# Patient Record
Sex: MALE | Race: WHITE
[De-identification: names, ages, dates, MRNs, and addresses within clinical notes are randomized per-mention and may not be internally consistent; named-entity substitution may affect disease eponyms.]

---

## 2020-12-25 ENCOUNTER — HOSPITAL ENCOUNTER (EMERGENCY)
Dept: HOSPITAL 46 - ED | Age: 80
Discharge: HOME | End: 2020-12-25
Payer: MEDICARE

## 2020-12-25 VITALS — WEIGHT: 179 LBS | BODY MASS INDEX: 26.51 KG/M2 | HEIGHT: 69 IN

## 2020-12-25 DIAGNOSIS — J44.9: ICD-10-CM

## 2020-12-25 DIAGNOSIS — Z79.82: ICD-10-CM

## 2020-12-25 DIAGNOSIS — Z85.118: ICD-10-CM

## 2020-12-25 DIAGNOSIS — Z79.899: ICD-10-CM

## 2020-12-25 DIAGNOSIS — I10: ICD-10-CM

## 2020-12-25 DIAGNOSIS — I82.622: ICD-10-CM

## 2020-12-25 DIAGNOSIS — T81.31XA: Primary | ICD-10-CM

## 2020-12-26 ENCOUNTER — HOSPITAL ENCOUNTER (EMERGENCY)
Dept: HOSPITAL 46 - ED | Age: 80
Discharge: HOME | End: 2020-12-26
Payer: MEDICARE

## 2020-12-26 VITALS — HEIGHT: 69 IN | BODY MASS INDEX: 26.51 KG/M2 | WEIGHT: 179 LBS

## 2020-12-26 DIAGNOSIS — Z76.0: Primary | ICD-10-CM

## 2020-12-26 NOTE — XMS
PreManage Notification: KARIME SOTO MRN:[id number]
 
Security Information
 
Security Events
No recent Security Events currently on file
 
 
 
CRITERIA MET
------------
- [facility] - 2 Visits in 30 Days
 
 
CARE PROVIDERS
There are no care providers on record at this time.
 
[facility] has no Care Guidelines for this patient.
 
DORENE VISIT COUNT (12 MO.)
-------------------------------------------------------------------------------------
2 [facility]
-------------------------------------------------------------------------------------
TOTAL 2
-------------------------------------------------------------------------------------
NOTE: Visits indicate total known visits.
 
ED/C VISIT TRACKING (12 MO.)
-------------------------------------------------------------------------------------
12/26/2020 11:29
[facility] [place]
 
TYPE: Emergency
 
COMPLAINT:
- DRESSING CHANGE
-------------------------------------------------------------------------------------
12/25/2020 10:36
[facility] [place]
 
TYPE: Emergency
 
COMPLAINT:
- POST OP PROBLEM
-------------------------------------------------------------------------------------
 
 
INPATIENT VISIT TRACKING (12 MO.)
No inpatient visits to display in this time frame
 
[email or web address]

## 2020-12-27 ENCOUNTER — HOSPITAL ENCOUNTER (EMERGENCY)
Dept: HOSPITAL 46 - ED | Age: 80
Discharge: HOME | End: 2020-12-27
Payer: MEDICARE

## 2020-12-27 VITALS — HEIGHT: 69 IN | BODY MASS INDEX: 26.51 KG/M2 | WEIGHT: 179 LBS

## 2020-12-27 DIAGNOSIS — Z76.0: Primary | ICD-10-CM

## 2020-12-27 NOTE — XMS
PreManage Notification: KARIME SOTO MRN:[id number]
 
Security Information
 
Security Events
No recent Security Events currently on file
 
 
 
CRITERIA MET
------------
- [facility] - 2 Visits in 30 Days
 
 
CARE PROVIDERS
There are no care providers on record at this time.
 
[facility] has no Care Guidelines for this patient.
 
DORENE VISIT COUNT (12 MO.)
-------------------------------------------------------------------------------------
3 [facility]
-------------------------------------------------------------------------------------
TOTAL 3
-------------------------------------------------------------------------------------
NOTE: Visits indicate total known visits.
 
ED/C VISIT TRACKING (12 MO.)
-------------------------------------------------------------------------------------
12/27/2020 11:40
[facility] [place]
 
TYPE: Emergency
 
COMPLAINT:
- MEDICATION REFILL
-------------------------------------------------------------------------------------
12/26/2020 11:29
[facility] [place]
 
TYPE: Emergency
 
COMPLAINT:
- DRESSING CHANGE
-------------------------------------------------------------------------------------
12/25/2020 10:36
[facility] [place]
 
TYPE: Emergency
 
COMPLAINT:
- POST OP PROBLEM
-------------------------------------------------------------------------------------
 
 
INPATIENT VISIT TRACKING (12 MO.)
No inpatient visits to display in this time frame
 
[email or web address]

## 2021-04-03 ENCOUNTER — HOSPITAL ENCOUNTER (EMERGENCY)
Dept: HOSPITAL 46 - ED | Age: 81
Discharge: HOME | End: 2021-04-03
Payer: MEDICARE

## 2021-04-03 VITALS — HEIGHT: 70 IN | WEIGHT: 178 LBS | BODY MASS INDEX: 25.48 KG/M2

## 2021-04-03 DIAGNOSIS — L03.114: Primary | ICD-10-CM

## 2021-04-03 DIAGNOSIS — Z85.118: ICD-10-CM

## 2021-04-03 DIAGNOSIS — Z87.891: ICD-10-CM

## 2021-04-03 DIAGNOSIS — J43.9: ICD-10-CM

## 2021-04-03 DIAGNOSIS — I10: ICD-10-CM

## 2021-04-03 DIAGNOSIS — Z79.899: ICD-10-CM

## 2021-04-03 DIAGNOSIS — I82.622: ICD-10-CM

## 2021-04-03 DIAGNOSIS — Z79.82: ICD-10-CM

## 2021-04-03 NOTE — XMS
PreManage Notification: KARIME SOTO MRN:[id number]
 
Security Information
 
Security Events
No recent Security Events currently on file
 
 
 
CRITERIA MET
------------
- PDMP
 
 
CARE PROVIDERS
-------------------------------------------------------------------------------------
ESTELLA MILLER     [facility]     12/28/2020-Current
 
PHONE: [phone]
-------------------------------------------------------------------------------------
 
[facility] has no Care Guidelines for this patient.
 
DORENE VISIT COUNT (12 MO.)
-------------------------------------------------------------------------------------
4 [facility]
-------------------------------------------------------------------------------------
TOTAL 4
-------------------------------------------------------------------------------------
NOTE: Visits indicate total known visits.
 
ED/UCC VISIT TRACKING (12 MO.)
-------------------------------------------------------------------------------------
04/03/2021 10:19
[facility] [place]
 
TYPE: Emergency
 
COMPLAINT:
- LT ARM/HAND SWELLING POST CHEMO
-------------------------------------------------------------------------------------
12/27/2020 11:40
[facility] [place]
 
TYPE: Emergency
 
COMPLAINT:
- MEDICATION REFILL
 
DIAGNOSES:
- Encounter for issue of repeat prescription
-------------------------------------------------------------------------------------
12/26/2020 11:29
[facility] [place]
 
TYPE: Emergency
 
COMPLAINT:
- DRESSING CHANGE
 
 
DIAGNOSES:
- Encounter for issue of repeat prescription
-------------------------------------------------------------------------------------
12/25/2020 10:36
[facility] [place]
 
TYPE: Emergency
 
COMPLAINT:
- POST OP PROBLEM
 
DIAGNOSES:
- Long term (current) use of aspirin
- Personal history of other malignant neoplasm of bronchus and lung
- Chronic obstructive pulmonary disease, unspecified
- Acute embolism and thrombosis of deep veins of left upper extremity
- Other long term (current) drug therapy
- Disruption of external operation (surgical) wound, not elsewhere classified,
initial encounter
- Disruption of external operation (surgical) wound, not elsewhere classified,
initial encounter
- Essential (primary) hypertension
- Acute embolism and thrombosis of deep veins of left upper extremity
-------------------------------------------------------------------------------------
 
 
INPATIENT VISIT TRACKING (12 MO.)
No inpatient visits to display in this time frame
 
[email or web address]

## 2021-04-27 ENCOUNTER — HOSPITAL ENCOUNTER (OUTPATIENT)
Dept: HOSPITAL 46 - OPS | Age: 81
Discharge: HOME | End: 2021-04-27
Attending: SURGERY
Payer: MEDICARE

## 2021-04-27 VITALS — BODY MASS INDEX: 24.53 KG/M2 | WEIGHT: 171.34 LBS | HEIGHT: 70 IN

## 2021-04-27 DIAGNOSIS — C77.1: ICD-10-CM

## 2021-04-27 DIAGNOSIS — I10: ICD-10-CM

## 2021-04-27 DIAGNOSIS — Z87.891: ICD-10-CM

## 2021-04-27 DIAGNOSIS — C77.0: ICD-10-CM

## 2021-04-27 DIAGNOSIS — C78.01: ICD-10-CM

## 2021-04-27 DIAGNOSIS — C34.12: Primary | ICD-10-CM

## 2021-04-27 PROCEDURE — 0JH63WZ INSERTION OF TOTALLY IMPLANTABLE VASCULAR ACCESS DEVICE INTO CHEST SUBCUTANEOUS TISSUE AND FASCIA, PERCUTANEOUS APPROACH: ICD-10-PCS | Performed by: SURGERY

## 2021-04-27 PROCEDURE — C1788 PORT, INDWELLING, IMP: HCPCS

## 2021-04-27 NOTE — OR
[facility]
                                    [address], [place]  [address]
_________________________________________________________________________________________
                                                                 Signed   
 
 
DATE OF OPERATION:
04/27/2021
 
SURGEON:
Marj Interiano MD
 
PREOPERATIVE DIAGNOSES:
Metastatic adenocarcinoma of the lung, left neck and perihilar adenopathy, need for
central venous access for chemotherapy. 
 
POSTOPERATIVE DIAGNOSES:
Metastatic adenocarcinoma of the lung, left neck and perihilar adenopathy, need for
central venous access for chemotherapy. 
 
PROCEDURES:
1. Ultrasound-guided right internal jugular Port-A-Cath placement (Bard port catheter).
2. Surgeon-directed fluoroscopy.
 
ANESTHESIA:
Local with monitored anesthesia care, Marj Galloway CRNA and local 8 mL of 0.25% Marcaine
with epinephrine. 
 
INDICATION:
This 80-year-old white man is a patient of Dr. Marj Moulton and under the care of Dr. Cronin for metastatic lung cancer (adenocarcinoma).  He has significant mediastinal
adenopathy and left-sided neck adenopathy related to the malignancy and biopsy in the
area has confirmed the underlying diagnosis.  The patient has been undergoing palliative
chemotherapy and had some radiation therapy as well, but now needs a Port-A-Cath for
ongoing therapy as his peripheral access is diminishing and quite difficult for him.  He
is admitted at this time to undergo Port-A-Cath placement (Bard port catheter type).  He
understands the risks of bleeding, infection, pneumothorax, and other unforeseen
complications.  Understanding this, he wished to proceed. 
 
FINDINGS:
Ultrasound evaluation of the right neck showed a patent internal jugular vein and
normal-appearing carotid artery.  Catheter placement was without complication.  Good
function was noted at conclusion of the procedure.  Postoperative chest x-ray in
recovery room confirms the tip of the catheter in the superior vena cava. 
 
DESCRIPTION OF PROCEDURE:
The patient was brought to the operating room and given intravenous sedation.  He is
placed on a wedge device given his somewhat marginal airway status.  He had poor
 
    Electronically Signed By: MARJ INTERIANO MD  04/27/21 1048
_________________________________________________________________________________________
PATIENT NAME:     KARIME SOTO                  
MEDICAL RECORD #: [id number]            OPERATIVE REPORT              
          ACCT #: [id number]  
DATE OF BIRTH:   [birth year]            REPORT #: [id number]      
PHYSICIAN:        MARJ INTERIANO MD                 
PCP:              ESTELLA MOULTON MD           
REPORT IS CONFIDENTIAL AND NOT TO BE RELEASED WITHOUT AUTHORIZATION
 
 
                                  [facility]
                                    [address], [place]  [address]
_________________________________________________________________________________________
                                                                 Signed   
 
 
flexibility of his neck, though he could turn slightly to the left.  He was placed in
mild Trendelenburg position and the upper neck and torso was clipped and prepared with a
chlorhexidine solution and draped sterilely.  Preoperative antibiotics were given.
Sequential compression device stockings were used as well.  The patient was on bridge
therapy having been treated for deep vein thrombosis of the left upper extremity, likely
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  Bridge therapy included
Lovenox 40 mg subcutaneously. 
 
After sterile preparation of the upper abdomen, a SonoSite ultrasound device was used to
interrogate the right neck area.  This showed the carotid artery and the right jugular
vein.  A 1% lidocaine was then injected over the site and under direct visualization,
the right jugular vein was accessed showing dark nonpulsatile blood.  A flexible J-wire
was passed down the needle without impediment.  Confirmation with the SonoSite device of
the needle in the jugular was undertaken. 
 
Local anesthesia was injected transversely over the right pectoralis, transverse
incision was made and a pocket created using blunt and electrocautery dissection.  A
port device was partially secured to the pectoralis fascia. 
 
The site from which the wire emanated in the neck was incised with an #11 blade and
dilated and subsequently dilator and peel-away sheath introducer passed over the wire
with subsequent removal of the wire and the dilator.  Retrograde dark nonpulsatile
bleeding was noted.  A previously inspected Bard port catheter, which had been irrigated
with heparinized saline solution was passed down the peel-away sheath introducer as far
as possible, peel-away sheath introducer removed.  The catheter was withdrawn under
fluoroscopic control and with various changes to bed position and so forth, ultimately
the catheter could be more fully visualized after irrigation of the catheter with some
radiocontrast.  The catheter was withdrawn to the area of the atriocaval junction
initially. 
 
Using the tunneling device, the catheter was delivered to the pocket, secured to the
port device per manufacture's instructions with the enclosed collar device with
appropriate orientation.  The port was secured in the pocket more fully with a 2-0
Vicryl suture.  Access of the catheter with an angled Alejandro needle allowed for easy
flushing of heparinized saline and easy withdrawal of blood.  Fluoroscopic evaluation
showed the catheter to have no kinks or other problems and was well positioned.  The
pocket was closed with interrupted 2-0 Vicryl and a running subcuticular 3-0 Vicryl.
The neck incision was closed with interrupted 3-0 Vicryl, Steri-Strips were applied to
both sites.  Accessed percutaneously with angled Alejandro needle showed easy withdrawal of
blood and easy flushing of heparinized saline without problem.  The patient was taken to
the recovery room in good condition having suffered no complication.  A postprocedure
chest x-ray was performed in the recovery room showing the catheter tip to be in the
 
    Electronically Signed By: MARJ INTERIANO MD  04/27/21 1048
_________________________________________________________________________________________
PATIENT NAME:     KARIME SOTO                  
MEDICAL RECORD #: [id number]            OPERATIVE REPORT              
          ACCT #: [id number]  
DATE OF BIRTH:   [birth year]            REPORT #: [id number]      
PHYSICIAN:        MARJ INTERIANO MD                 
PCP:              ESTELLA MOULTON MD           
REPORT IS CONFIDENTIAL AND NOT TO BE RELEASED WITHOUT AUTHORIZATION
 
 
                                  [facility]
                                    [address]
                                  [place], [place]  [address]
_________________________________________________________________________________________
                                                                 Signed   
 
 
superior vena cava a 
bit higher than anticipated, likely related to body position change compared to at time
of operation. 
 
 
 
            ________________________________________
            MD BAKARI James/JEFFERYL
Job #:  [id number]
DD:  04/27/2021 08:50:17
DT:  04/27/2021 09:31:08
 
cc:            MD Estella Lloyd MD
 
 
Copies:  EARL CRONIN MD, JONATHAN MD
~
 
 
 
 
 
 
 
 
 
 
 
 
 
 
 
 
 
 
 
 
    Electronically Signed By: MARJ INTERIANO MD  04/27/21 1048
_________________________________________________________________________________________
PATIENT NAME:     KARIME SOTO                  
MEDICAL RECORD #: [id number]            OPERATIVE REPORT              
          ACCT #: [id number]  
DATE OF BIRTH:   [birth year]            REPORT #: [id number]      
PHYSICIAN:        MARJ INTERIANO MD                 
PCP:              ESTELLA MOULTON MD           
REPORT IS CONFIDENTIAL AND NOT TO BE RELEASED WITHOUT AUTHORIZATION

## 2021-04-27 NOTE — NUR
04/27/21 0847 W[place],May
0828 PT ARRIVED TO PACU ON 10L VIA MASK, RESP EVEN AND UNLABORED WITH
ORAL AND NASAL AIRWAY IN MOUTH. VSS. CRNA AT BEDSIDE AND BP MEDICATION
GIVEN.
 
0831 O2 DECREASED TO 6L.
 
0837 X-RAY AT BEDSIDE.
 
0842 PT WAKES TO TACTILE STIMULI AND AIRWAYS REMOVED. PT REORIENTED TO
PACU AND EASILY FALLS BACK TO SLEEP.

## 2021-04-27 NOTE — NUR
PT ALERT, ORIENTED AND SUPPORTED BY HIS WIFE HERLINDA. PT HAS QUIET, RASPY
VOICE, BUT ABLE TO UNDERSTAND EASILY. PT WOULD LIKE TO VISIT WITH DR INTERIANO
INFORMED PT HE WILL BE IN BEFORE. PT AND HERLINDA SEEM SATISFIED. PT APPEARS
SOMEWHAT ANXIOUS, GAVE ENCOURAGEMENT, PT ALLOWED ME TO PRAY FOR HIM, WILL
FOLLOW AS NEEDED

## 2021-05-22 ENCOUNTER — HOSPITAL ENCOUNTER (EMERGENCY)
Dept: HOSPITAL 46 - ED | Age: 81
Discharge: HOME | End: 2021-05-22
Payer: MEDICARE

## 2021-05-22 VITALS — BODY MASS INDEX: 24.91 KG/M2 | HEIGHT: 70 IN | WEIGHT: 173.99 LBS

## 2021-05-22 DIAGNOSIS — T82.838A: Primary | ICD-10-CM

## 2021-05-22 DIAGNOSIS — I10: ICD-10-CM

## 2021-05-22 DIAGNOSIS — J43.9: ICD-10-CM

## 2021-05-22 DIAGNOSIS — Z87.891: ICD-10-CM

## 2021-05-22 DIAGNOSIS — Z79.899: ICD-10-CM

## 2021-05-22 DIAGNOSIS — Z85.118: ICD-10-CM

## 2021-05-22 NOTE — XMS
PreManage Notification: KARIME SOTO MRN:[id number]
 
Security Information
 
Security Events
No recent Security Events currently on file
 
 
 
CRITERIA MET
------------
- JASMINA
 
 
CARE PROVIDERS
-------------------------------------------------------------------------------------
CIELO FRITZROSEANNE           Internal Medicine: Medical Oncology     04/05/2021-Current
EARL
 
PHONE: [phone]
-------------------------------------------------------------------------------------
ESTELLA MILLER     [facility]     12/28/2020-Current
 
PHONE: [phone]
-------------------------------------------------------------------------------------
 
[facility] has no Care Guidelines for this patient.
 
ENARENDRA VISIT COUNT (12 MO.)
-------------------------------------------------------------------------------------
[facility]
-------------------------------------------------------------------------------------
TOTAL 5
-------------------------------------------------------------------------------------
NOTE: Visits indicate total known visits.
 
ED/UCC VISIT TRACKING (12 MO.)
-------------------------------------------------------------------------------------
05/22/2021 08:01
[facility] [place]
 
TYPE: Emergency
 
COMPLAINT:
- PORT BLEEDING
-------------------------------------------------------------------------------------
04/03/2021 10:19
[facility] [place]
 
TYPE: Emergency
 
COMPLAINT:
- LT ARM/HAND SWELLING POST CHEMO
 
DIAGNOSES:
- Other long term (current) drug therapy
- Personal history of nicotine dependence
- Essential (primary) hypertension
- Personal history of other malignant neoplasm of bronchus and lung
 
- Emphysema, unspecified
- Cellulitis of left upper limb
- Acute embolism and thrombosis of deep veins of left upper extremity
- Long term (current) use of aspirin
- Other specified soft tissue disorders
-------------------------------------------------------------------------------------
12/27/2020 11:40
[facility] [place]
 
TYPE: Emergency
 
COMPLAINT:
- MEDICATION REFILL
 
DIAGNOSES:
- Encounter for issue of repeat prescription
-------------------------------------------------------------------------------------
12/26/2020 11:29
[facility]
 
TYPE: Emergency
 
COMPLAINT:
- DRESSING CHANGE
 
DIAGNOSES:
- Encounter for issue of repeat prescription
-------------------------------------------------------------------------------------
12/25/2020 10:36
[facility] [place]
 
TYPE: Emergency
 
COMPLAINT:
- POST OP PROBLEM
 
DIAGNOSES:
- Long term (current) use of aspirin
- Personal history of other malignant neoplasm of bronchus and lung
- Chronic obstructive pulmonary disease, unspecified
- Acute embolism and thrombosis of deep veins of left upper extremity
- Other long term (current) drug therapy
- Disruption of external operation (surgical) wound, not elsewhere classified,
initial encounter
- Disruption of external operation (surgical) wound, not elsewhere classified,
initial encounter
- Essential (primary) hypertension
- Acute embolism and thrombosis of deep veins of left upper extremity
-------------------------------------------------------------------------------------
 
 
INPATIENT VISIT TRACKING (12 MO.)
No inpatient visits to display in this time frame
 
[email or web address]

## 2022-01-03 ENCOUNTER — HOSPITAL ENCOUNTER (EMERGENCY)
Dept: HOSPITAL 46 - ED | Age: 82
LOS: 1 days | Discharge: HOME | End: 2022-01-04
Payer: MEDICARE

## 2022-01-03 VITALS — WEIGHT: 169.01 LBS | HEIGHT: 70 IN | BODY MASS INDEX: 24.2 KG/M2

## 2022-01-03 DIAGNOSIS — I10: ICD-10-CM

## 2022-01-03 DIAGNOSIS — Z79.899: ICD-10-CM

## 2022-01-03 DIAGNOSIS — Z87.891: ICD-10-CM

## 2022-01-03 DIAGNOSIS — J43.9: ICD-10-CM

## 2022-01-03 DIAGNOSIS — C34.90: Primary | ICD-10-CM

## 2022-01-03 DIAGNOSIS — Z79.51: ICD-10-CM

## 2022-01-03 PROCEDURE — G0480 DRUG TEST DEF 1-7 CLASSES: HCPCS

## 2022-01-22 ENCOUNTER — HOSPITAL ENCOUNTER (EMERGENCY)
Dept: HOSPITAL 46 - ED | Age: 82
Discharge: HOME | End: 2022-01-22
Payer: MEDICARE

## 2022-01-22 VITALS — HEIGHT: 70 IN | WEIGHT: 155.01 LBS | BODY MASS INDEX: 22.19 KG/M2

## 2022-01-22 DIAGNOSIS — J43.9: ICD-10-CM

## 2022-01-22 DIAGNOSIS — K59.03: ICD-10-CM

## 2022-01-22 DIAGNOSIS — T40.2X5A: ICD-10-CM

## 2022-01-22 DIAGNOSIS — C34.90: ICD-10-CM

## 2022-01-22 DIAGNOSIS — Z79.899: ICD-10-CM

## 2022-01-22 DIAGNOSIS — Z51.5: ICD-10-CM

## 2022-01-22 DIAGNOSIS — I10: ICD-10-CM

## 2022-01-22 DIAGNOSIS — Z87.891: ICD-10-CM

## 2022-01-22 DIAGNOSIS — R33.9: Primary | ICD-10-CM

## 2022-01-22 DIAGNOSIS — Z79.891: ICD-10-CM

## 2022-01-22 NOTE — XMS
PreManage Notification: KARIME SOTO MRN:[id number]
 
Security Information
 
Security Events
No recent Security Events currently on file
 
 
 
CRITERIA MET
------------
- PDM
- [facility] - 2 Visits in 30 Days
 
 
CARE PROVIDERS
-------------------------------------------------------------------------------------
ROSEANNE FRITZ           Internal Medicine: Medical Oncology     04/05/2021-Roman [facility]
 
PHONE: [phone]
-------------------------------------------------------------------------------------
ESTELLA MILLER     Family Medicine     12/28/2020-Current
 
PHONE: Unknown
-------------------------------------------------------------------------------------
 
[facility] has no Care Guidelines for this patient.
 
DORENE VISIT COUNT (12 MO.)
-------------------------------------------------------------------------------------
[facility]
-------------------------------------------------------------------------------------
TOTAL 4
-------------------------------------------------------------------------------------
NOTE: Visits indicate total known visits.
 
ED/UCC VISIT TRACKING (12 MO.)
-------------------------------------------------------------------------------------
01/22/2022 03:34
[facility] [place]
 
TYPE: Emergency
 
COMPLAINT:
- UNABLE TO URINATE
-------------------------------------------------------------------------------------
01/03/2022 13:34
[facility] [place]
 
TYPE: Emergency
 
COMPLAINT:
- ALTERED LOC
 
DIAGNOSES:
- Essential (primary) hypertension
- Personal history of nicotine dependence
- Long term (current) use of inhaled steroids
 
- Emphysema, unspecified
- Malignant neoplasm of unspecified part of unspecified bronchus or lung
- Other long term (current) drug therapy
-------------------------------------------------------------------------------------
05/22/2021 08:01
[facility] [place]
 
TYPE: Emergency
 
COMPLAINT:
- PORT BLEEDING
 
DIAGNOSES:
- Other long term (current) drug therapy
- Essential (primary) hypertension
- Emphysema, unspecified
- Personal history of nicotine dependence
- Personal history of other malignant neoplasm of bronchus and lung
- Hemorrhage due to vascular prosthetic devices, implants and grafts, initial
encounter
-------------------------------------------------------------------------------------
04/03/2021 10:19
[facility] [place]
 
TYPE: Emergency
 
COMPLAINT:
- LT ARM/HAND SWELLING POST CHEMO
 
DIAGNOSES:
- Other long term (current) drug therapy
- Personal history of nicotine dependence
- Essential (primary) hypertension
- Personal history of other malignant neoplasm of bronchus and lung
- Emphysema, unspecified
- Cellulitis of left upper limb
- Acute embolism and thrombosis of deep veins of left upper extremity
- Long term (current) use of aspirin
- Other specified soft tissue disorders
-------------------------------------------------------------------------------------
 
 
INPATIENT VISIT TRACKING (12 MO.)
No inpatient visits to display in this time frame
 
[email or web address]

## 2022-02-02 ENCOUNTER — HOSPITAL ENCOUNTER (INPATIENT)
Dept: HOSPITAL 46 - ED | Age: 82
LOS: 2 days | Discharge: HOME | DRG: 180 | End: 2022-02-04
Attending: INTERNAL MEDICINE | Admitting: INTERNAL MEDICINE
Payer: MEDICARE

## 2022-02-02 VITALS — WEIGHT: 188.5 LBS | BODY MASS INDEX: 26.99 KG/M2 | HEIGHT: 70 IN

## 2022-02-02 DIAGNOSIS — J43.9: ICD-10-CM

## 2022-02-02 DIAGNOSIS — Z20.822: ICD-10-CM

## 2022-02-02 DIAGNOSIS — J38.00: ICD-10-CM

## 2022-02-02 DIAGNOSIS — Z96.642: ICD-10-CM

## 2022-02-02 DIAGNOSIS — Z96.651: ICD-10-CM

## 2022-02-02 DIAGNOSIS — J91.0: ICD-10-CM

## 2022-02-02 DIAGNOSIS — J98.11: ICD-10-CM

## 2022-02-02 DIAGNOSIS — Z98.890: ICD-10-CM

## 2022-02-02 DIAGNOSIS — C34.90: Primary | ICD-10-CM

## 2022-02-02 DIAGNOSIS — Z66: ICD-10-CM

## 2022-02-02 DIAGNOSIS — Z99.81: ICD-10-CM

## 2022-02-02 DIAGNOSIS — Z79.01: ICD-10-CM

## 2022-02-02 DIAGNOSIS — I10: ICD-10-CM

## 2022-02-02 DIAGNOSIS — J96.21: ICD-10-CM

## 2022-02-02 DIAGNOSIS — Z86.711: ICD-10-CM

## 2022-02-02 DIAGNOSIS — Z79.899: ICD-10-CM

## 2022-02-02 DIAGNOSIS — R33.9: ICD-10-CM

## 2022-02-02 PROCEDURE — 5A09357 ASSISTANCE WITH RESPIRATORY VENTILATION, LESS THAN 24 CONSECUTIVE HOURS, CONTINUOUS POSITIVE AIRWAY PRESSURE: ICD-10-PCS | Performed by: INTERNAL MEDICINE

## 2022-02-02 PROCEDURE — U0003 INFECTIOUS AGENT DETECTION BY NUCLEIC ACID (DNA OR RNA); SEVERE ACUTE RESPIRATORY SYNDROME CORONAVIRUS 2 (SARS-COV-2) (CORONAVIRUS DISEASE [COVID-19]), AMPLIFIED PROBE TECHNIQUE, MAKING USE OF HIGH THROUGHPUT TECHNOLOGIES AS DESCRIBED BY CMS-2020-01-R: HCPCS

## 2022-02-02 PROCEDURE — C9803 HOPD COVID-19 SPEC COLLECT: HCPCS

## 2022-02-02 NOTE — XMS
PreManage Notification: KARIME SOTO MRN:[id number]
 
Security Information
 
Security Events
No recent Security Events currently on file
 
 
 
CRITERIA MET
------------
- [facility]
- [facility] - 2 Visits in 30 Days
 
 
CARE PROVIDERS
-------------------------------------------------------------------------------------
SARTHAK FRITZROSEANNE           Internal Medicine: Medical Oncology     04/05/2021-Current
[facility]
 
PHONE: [phone]
-------------------------------------------------------------------------------------
ESTELLA MILLER     Family Medicine     12/28/2020-Current
 
PHONE: Unknown
-------------------------------------------------------------------------------------
EARL MAJOR      [facility]     Current
 
PHONE: [phone]
-------------------------------------------------------------------------------------
 
[facility] has no Care Guidelines for this patient.
 
E.D. VISIT COUNT (12 MO.)
-------------------------------------------------------------------------------------
5 [facility] Beckwith
-------------------------------------------------------------------------------------
TOTAL 5
-------------------------------------------------------------------------------------
NOTE: Visits indicate total known visits.
 
ED/UCC VISIT TRACKING (12 MO.)
-------------------------------------------------------------------------------------
02/02/2022 09:35
[facility] [place]
 
TYPE: Emergency
 
COMPLAINT:
- SOB
-------------------------------------------------------------------------------------
01/22/2022 03:34
[facility] [place]
 
TYPE: Emergency
 
COMPLAINT:
- UNABLE TO URINATE
 
 
DIAGNOSES:
- Emphysema, unspecified
- Adverse effect of other opioids, initial encounter
- Long term (current) use of opiate analgesic
- Essential (primary) hypertension
- Malignant neoplasm of unspecified part of unspecified bronchus or lung
- Retention of urine, unspecified
- Encounter for palliative care
- Other long term (current) drug therapy
- Personal history of nicotine dependence
- Drug induced constipation
-------------------------------------------------------------------------------------
01/03/2022 13:34
[facility] [place]
 
TYPE: Emergency
 
COMPLAINT:
- ALTERED LOC
 
DIAGNOSES:
- Essential (primary) hypertension
- Personal history of nicotine dependence
- Long term (current) use of inhaled steroids
- Emphysema, unspecified
- Malignant neoplasm of unspecified part of unspecified bronchus or lung
- Other long term (current) drug therapy
-------------------------------------------------------------------------------------
05/22/2021 08:01
[facility] [place]
 
TYPE: Emergency
 
COMPLAINT:
- PORT BLEEDING
 
DIAGNOSES:
- Other long term (current) drug therapy
- Essential (primary) hypertension
- Emphysema, unspecified
- Personal history of nicotine dependence
- Personal history of other malignant neoplasm of bronchus and lung
- Hemorrhage due to vascular prosthetic devices, implants and grafts, initial
encounter
-------------------------------------------------------------------------------------
04/03/2021 10:19
[facility] [place]
 
TYPE: Emergency
 
COMPLAINT:
- LT ARM/HAND SWELLING POST CHEMO
 
DIAGNOSES:
- Other long term (current) drug therapy
- Personal history of nicotine dependence
- Essential (primary) hypertension
- Personal history of other malignant neoplasm of bronchus and lung
- Emphysema, unspecified
- Cellulitis of left upper limb
 
- Acute embolism and thrombosis of deep veins of left upper extremity
- Long term (current) use of aspirin
- Other specified soft tissue disorders
-------------------------------------------------------------------------------------
 
 
INPATIENT VISIT TRACKING (12 MO.)
No inpatient visits to display in this time frame
 
[email or web address]

## 2022-02-03 NOTE — EKG
[facility]
                                    [address]
                                  [place]  [address]
_________________________________________________________________________________________
                                                                 Signed   
 
 
Sinus rhythm with occasional premature ventricular complexes and premature atrial
complexes
Left axis deviation
Right bundle branch block
Abnormal ECG
When compared with ECG of 08-DEC-2020 14:27,
UT interval has decreased
Nonspecific T wave abnormality, worse in Lateral leads
Confirmed by LUCY PORTILLO MD ([id number]) on 2/3/2022 6:51:58 PM
 
 
 
 
 
 
 
 
 
 
 
 
 
 
 
 
 
 
 
 
 
 
 
 
 
 
 
 
 
 
 
 
 
 
 
 
    Electronically Signed By: LUCY PORTILLO MD  02/03/22 1852
_________________________________________________________________________________________
PATIENT NAME:     KARIME SOTO                  
MEDICAL RECORD #: [id number]                     Electrocardiogram             
          ACCT #: [id number]  
DATE OF BIRTH:   [birth year]                                       
PHYSICIAN:   LUCY PORTILLO MD           REPORT #: [id number]
REPORT IS CONFIDENTIAL AND NOT TO BE RELEASED WITHOUT AUTHORIZATION